# Patient Record
Sex: FEMALE | Race: BLACK OR AFRICAN AMERICAN
[De-identification: names, ages, dates, MRNs, and addresses within clinical notes are randomized per-mention and may not be internally consistent; named-entity substitution may affect disease eponyms.]

---

## 2006-05-05 VITALS — TEMPERATURE: 99.2 F

## 2017-01-06 ENCOUNTER — HOSPITAL ENCOUNTER (EMERGENCY)
Dept: HOSPITAL 19 - COL.ER | Age: 32
Discharge: HOME | End: 2017-01-06
Payer: MEDICARE

## 2017-01-06 VITALS — SYSTOLIC BLOOD PRESSURE: 124 MMHG | HEART RATE: 77 BPM | DIASTOLIC BLOOD PRESSURE: 76 MMHG

## 2017-01-06 VITALS — TEMPERATURE: 97.5 F

## 2017-01-06 VITALS — WEIGHT: 240.52 LBS | BODY MASS INDEX: 33.67 KG/M2 | HEIGHT: 70.98 IN

## 2017-01-06 DIAGNOSIS — R11.2: Primary | ICD-10-CM

## 2017-01-06 LAB
ADJUSTED CALCIUM: 9.3 MG/DL (ref 8.4–10.2)
ALBUMIN SERPL-MCNC: 3.9 GM/DL (ref 3.5–5)
ALP SERPL-CCNC: 62 U/L (ref 50–136)
ALT SERPL-CCNC: 28 U/L (ref 9–52)
ANION GAP SERPL CALC-SCNC: 10 MMOL/L (ref 7–16)
BASOPHILS # BLD: 0 10*3/UL (ref 0–0.2)
BASOPHILS NFR BLD AUTO: 0.3 % (ref 0–2)
BILIRUB SERPL-MCNC: 0.6 MG/DL (ref 0–1)
BUN SERPL-MCNC: 10 MG/DL (ref 7–17)
CALCIUM SERPL-MCNC: 9.2 MG/DL (ref 8.4–10.2)
CHLORIDE SERPL-SCNC: 100 MMOL/L (ref 98–107)
CO2 SERPL-SCNC: 27 MMOL/L (ref 22–30)
CREAT SERPL-SCNC: 0.85 MG/DL (ref 0.52–1.25)
EOSINOPHIL # BLD: 0.6 10*3/UL (ref 0–0.7)
EOSINOPHIL NFR BLD: 9.6 % (ref 0–4)
ERYTHROCYTE [DISTWIDTH] IN BLOOD BY AUTOMATED COUNT: 12.3 % (ref 11.5–14.5)
GLUCOSE SERPL-MCNC: 102 MG/DL (ref 74–106)
GRANULOCYTES # BLD AUTO: 38.2 % (ref 42.2–75.2)
HCT VFR BLD AUTO: 37.8 % (ref 37–47)
HGB BLD-MCNC: 12.7 G/DL (ref 12.5–16)
LIPASE SERPL-CCNC: 22 U/L (ref 23–300)
LYMPHOCYTES # BLD: 2.9 10*3/UL (ref 1.2–3.4)
LYMPHOCYTES NFR BLD: 47.4 % (ref 20–51)
MCH RBC QN AUTO: 29 PG (ref 27–31)
MCHC RBC AUTO-ENTMCNC: 34 G/DL (ref 33–37)
MCV RBC AUTO: 87 FL (ref 80–100)
MONOCYTES # BLD: 0.3 10*3/UL (ref 0.1–0.6)
MONOCYTES NFR BLD AUTO: 4.3 % (ref 1.7–9.3)
NEUTROPHILS # BLD: 2.3 10*3/UL (ref 1.4–6.5)
PLATELET # BLD AUTO: 285 K/MM3 (ref 130–400)
PMV BLD AUTO: 9.8 FL (ref 7.4–10.4)
POTASSIUM SERPL-SCNC: 3.8 MMOL/L (ref 3.4–5)
PROT SERPL-MCNC: 7.5 GM/DL (ref 6.4–8.2)
RBC # BLD AUTO: 4.37 M/MM3 (ref 4.1–5.3)
SODIUM SERPL-SCNC: 137 MMOL/L (ref 137–145)
WBC # BLD AUTO: 6.1 K/MM3 (ref 4.8–10.8)

## 2017-07-31 ENCOUNTER — HOSPITAL ENCOUNTER (EMERGENCY)
Dept: HOSPITAL 19 - COL.ER | Age: 32
Discharge: HOME | End: 2017-07-31
Payer: MEDICARE

## 2017-07-31 VITALS — BODY MASS INDEX: 32.98 KG/M2 | HEIGHT: 70 IN | WEIGHT: 230.38 LBS

## 2017-07-31 VITALS — TEMPERATURE: 97.9 F

## 2017-07-31 VITALS — HEART RATE: 91 BPM | SYSTOLIC BLOOD PRESSURE: 118 MMHG | DIASTOLIC BLOOD PRESSURE: 80 MMHG

## 2017-07-31 DIAGNOSIS — H10.9: Primary | ICD-10-CM

## 2017-07-31 DIAGNOSIS — G89.29: ICD-10-CM

## 2017-07-31 DIAGNOSIS — F17.200: ICD-10-CM

## 2017-07-31 DIAGNOSIS — F43.10: ICD-10-CM

## 2017-07-31 DIAGNOSIS — F41.9: ICD-10-CM

## 2017-07-31 DIAGNOSIS — M54.9: ICD-10-CM

## 2017-07-31 DIAGNOSIS — J45.909: ICD-10-CM

## 2017-07-31 DIAGNOSIS — F31.9: ICD-10-CM

## 2017-08-24 ENCOUNTER — HOSPITAL ENCOUNTER (EMERGENCY)
Dept: HOSPITAL 19 - COL.ER | Age: 32
LOS: 1 days | Discharge: HOME | End: 2017-08-25
Payer: MEDICARE

## 2017-08-24 VITALS — TEMPERATURE: 98.9 F | DIASTOLIC BLOOD PRESSURE: 75 MMHG | SYSTOLIC BLOOD PRESSURE: 124 MMHG

## 2017-08-24 VITALS — WEIGHT: 232.59 LBS | HEIGHT: 69.02 IN | BODY MASS INDEX: 34.45 KG/M2

## 2017-08-24 DIAGNOSIS — F17.210: ICD-10-CM

## 2017-08-24 DIAGNOSIS — H57.13: Primary | ICD-10-CM

## 2017-08-25 VITALS — HEART RATE: 92 BPM

## 2017-09-10 ENCOUNTER — HOSPITAL ENCOUNTER (EMERGENCY)
Dept: HOSPITAL 19 - COL.ER | Age: 32
Discharge: HOME | End: 2017-09-10
Payer: MEDICARE

## 2017-09-10 VITALS — BODY MASS INDEX: 34.12 KG/M2 | HEIGHT: 69.02 IN | WEIGHT: 230.38 LBS

## 2017-09-10 VITALS — TEMPERATURE: 97.1 F

## 2017-09-10 VITALS — SYSTOLIC BLOOD PRESSURE: 145 MMHG | HEART RATE: 66 BPM | DIASTOLIC BLOOD PRESSURE: 80 MMHG

## 2017-09-10 DIAGNOSIS — Z32.02: ICD-10-CM

## 2017-09-10 DIAGNOSIS — F31.9: ICD-10-CM

## 2017-09-10 DIAGNOSIS — F17.210: ICD-10-CM

## 2017-09-10 DIAGNOSIS — R11.2: ICD-10-CM

## 2017-09-10 DIAGNOSIS — R51: Primary | ICD-10-CM

## 2017-09-10 LAB
ADJUSTED CALCIUM: 9.1 MG/DL (ref 8.4–10.2)
ALBUMIN SERPL-MCNC: 3.9 GM/DL (ref 3.5–5)
ALP SERPL-CCNC: 58 U/L (ref 50–136)
ALT SERPL-CCNC: 26 U/L (ref 9–52)
ANION GAP SERPL CALC-SCNC: 7 MMOL/L (ref 7–16)
BASOPHILS # BLD: 0 10*3/UL (ref 0–0.2)
BASOPHILS NFR BLD AUTO: 0.7 % (ref 0–2)
BILIRUB SERPL-MCNC: 0.3 MG/DL (ref 0–1)
BUN SERPL-MCNC: 5 MG/DL (ref 7–17)
CALCIUM SERPL-MCNC: 9 MG/DL (ref 8.4–10.2)
CHLORIDE SERPL-SCNC: 99 MMOL/L (ref 98–107)
CO2 SERPL-SCNC: 29 MMOL/L (ref 22–30)
CREAT SERPL-SCNC: 0.69 MG/DL (ref 0.52–1.25)
EOSINOPHIL # BLD: 0.1 10*3/UL (ref 0–0.7)
EOSINOPHIL NFR BLD: 3.4 % (ref 0–4)
ERYTHROCYTE [DISTWIDTH] IN BLOOD BY AUTOMATED COUNT: 14.3 % (ref 11.5–14.5)
GLUCOSE SERPL-MCNC: 95 MG/DL (ref 74–106)
GRANULOCYTES # BLD AUTO: 55 % (ref 42.2–75.2)
HCT VFR BLD AUTO: 37.1 % (ref 37–47)
HGB BLD-MCNC: 12 G/DL (ref 12.5–16)
LYMPHOCYTES # BLD: 1.4 10*3/UL (ref 1.2–3.4)
LYMPHOCYTES NFR BLD: 35.1 % (ref 20–51)
MCH RBC QN AUTO: 29 PG (ref 27–31)
MCHC RBC AUTO-ENTMCNC: 32 G/DL (ref 33–37)
MCV RBC AUTO: 91 FL (ref 80–100)
MONOCYTES # BLD: 0.2 10*3/UL (ref 0.1–0.6)
MONOCYTES NFR BLD AUTO: 5.6 % (ref 1.7–9.3)
NEUTROPHILS # BLD: 2.3 10*3/UL (ref 1.4–6.5)
PH UR STRIP.AUTO: 7 [PH] (ref 5–8)
PLATELET # BLD AUTO: 280 K/MM3 (ref 130–400)
PMV BLD AUTO: 10.3 FL (ref 7.4–10.4)
POTASSIUM SERPL-SCNC: 4.1 MMOL/L (ref 3.4–5)
PROT SERPL-MCNC: 7.2 GM/DL (ref 6.4–8.2)
RBC # BLD AUTO: 4.1 M/MM3 (ref 4.1–5.3)
RBC # UR: (no result) /HPF
SODIUM SERPL-SCNC: 136 MMOL/L (ref 137–145)
SP GR UR STRIP.AUTO: 1.01 (ref 1–1.03)
SQUAMOUS # URNS: (no result) /HPF
WBC # BLD AUTO: 4.1 K/MM3 (ref 4.8–10.8)
WBC # UR: (no result) /HPF

## 2019-07-31 ENCOUNTER — HOSPITAL ENCOUNTER (EMERGENCY)
Dept: HOSPITAL 19 - COL.ER | Age: 34
Discharge: HOME | End: 2019-07-31
Payer: MEDICARE

## 2019-07-31 VITALS — BODY MASS INDEX: 34.87 KG/M2 | WEIGHT: 235.45 LBS | HEIGHT: 69.02 IN

## 2019-07-31 VITALS — HEART RATE: 93 BPM

## 2019-07-31 VITALS — TEMPERATURE: 97.3 F | SYSTOLIC BLOOD PRESSURE: 137 MMHG | DIASTOLIC BLOOD PRESSURE: 86 MMHG

## 2019-07-31 DIAGNOSIS — J45.909: ICD-10-CM

## 2019-07-31 DIAGNOSIS — F17.210: ICD-10-CM

## 2019-07-31 DIAGNOSIS — F31.9: ICD-10-CM

## 2019-07-31 DIAGNOSIS — F41.9: ICD-10-CM

## 2019-07-31 DIAGNOSIS — G43.909: Primary | ICD-10-CM

## 2019-07-31 DIAGNOSIS — F43.10: ICD-10-CM

## 2020-10-09 ENCOUNTER — HOSPITAL ENCOUNTER (OUTPATIENT)
Dept: HOSPITAL 19 - COL.RAD | Age: 35
End: 2020-10-09
Attending: FAMILY MEDICINE
Payer: MEDICARE

## 2020-10-09 VITALS — DIASTOLIC BLOOD PRESSURE: 65 MMHG | HEART RATE: 107 BPM | SYSTOLIC BLOOD PRESSURE: 104 MMHG

## 2020-10-09 VITALS — WEIGHT: 277.12 LBS | HEIGHT: 69.02 IN | BODY MASS INDEX: 41.04 KG/M2

## 2020-10-09 VITALS — HEART RATE: 90 BPM | DIASTOLIC BLOOD PRESSURE: 41 MMHG | SYSTOLIC BLOOD PRESSURE: 106 MMHG

## 2020-10-09 DIAGNOSIS — M51.36: Primary | ICD-10-CM

## 2020-10-28 ENCOUNTER — HOSPITAL ENCOUNTER (OUTPATIENT)
Dept: HOSPITAL 19 - COL.RAD | Age: 35
End: 2020-10-28
Attending: FAMILY MEDICINE
Payer: MEDICARE

## 2020-10-28 VITALS — HEIGHT: 69.02 IN | WEIGHT: 273.37 LBS | BODY MASS INDEX: 40.49 KG/M2

## 2020-10-28 VITALS — SYSTOLIC BLOOD PRESSURE: 137 MMHG | DIASTOLIC BLOOD PRESSURE: 98 MMHG | HEART RATE: 91 BPM

## 2020-10-28 VITALS — SYSTOLIC BLOOD PRESSURE: 150 MMHG | DIASTOLIC BLOOD PRESSURE: 94 MMHG | HEART RATE: 95 BPM

## 2020-10-28 DIAGNOSIS — M47.816: ICD-10-CM

## 2020-10-28 DIAGNOSIS — M48.061: Primary | ICD-10-CM

## 2020-10-28 DIAGNOSIS — M51.26: ICD-10-CM

## 2021-02-08 ENCOUNTER — HOSPITAL ENCOUNTER (OUTPATIENT)
Dept: HOSPITAL 19 - COL.RAD | Age: 36
End: 2021-02-08
Attending: FAMILY MEDICINE
Payer: MEDICARE

## 2021-02-08 VITALS — HEART RATE: 90 BPM | DIASTOLIC BLOOD PRESSURE: 84 MMHG | SYSTOLIC BLOOD PRESSURE: 146 MMHG

## 2021-02-08 VITALS — HEART RATE: 98 BPM | DIASTOLIC BLOOD PRESSURE: 79 MMHG | SYSTOLIC BLOOD PRESSURE: 131 MMHG

## 2021-02-08 VITALS — WEIGHT: 280.43 LBS | BODY MASS INDEX: 41.53 KG/M2 | HEIGHT: 69.02 IN

## 2021-02-08 DIAGNOSIS — M51.37: Primary | ICD-10-CM

## 2021-08-07 ENCOUNTER — HOSPITAL ENCOUNTER (EMERGENCY)
Dept: HOSPITAL 19 - COL.ER | Age: 36
Discharge: HOME | End: 2021-08-07
Attending: FAMILY MEDICINE
Payer: MEDICARE

## 2021-08-07 VITALS — HEART RATE: 105 BPM | SYSTOLIC BLOOD PRESSURE: 129 MMHG | DIASTOLIC BLOOD PRESSURE: 96 MMHG

## 2021-08-07 VITALS — BODY MASS INDEX: 35.62 KG/M2 | WEIGHT: 240.52 LBS | HEIGHT: 69.02 IN

## 2021-08-07 VITALS — TEMPERATURE: 98 F
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
## 2021-08-07 DIAGNOSIS — M51.36: Primary | ICD-10-CM

## 2021-08-07 DIAGNOSIS — F43.10: ICD-10-CM

## 2021-08-07 DIAGNOSIS — Z79.899: ICD-10-CM

## 2021-08-07 DIAGNOSIS — F31.9: ICD-10-CM

## 2021-08-07 DIAGNOSIS — F41.9: ICD-10-CM

## 2021-08-07 NOTE — NUR
SW called to ER by nurse stating that patient requested to speak to someone
about a concern that she had in regards to staff. [facility]CO met with patient and
daughter Sia present during conversation. Patient stated that she felt as
though there were no individuals present as far as nurses and doctors that
would listen to what she had to say in regards to her care. SW listened to
patient and provided feedback in regards to her concern. Patient appreciated a
listening ear and wanted to ensure that all staff in the hospital should have
proper bedside manner when communicating with patients about their care.
Patient also stated that she had not eaten or slept in the past few days due
to an accident that she had been in, and worrying about other family members.
SW provided information to patient in regards to the importance of eating and
sleeping well to be able to properly function to complete tasks effectively.
Patient agreed to information and stated that she would do better moving
forward and appreciated the listening ear. Nurse came in soon after to provide
patient with discharge instructions. Nothing further.

## 2022-02-21 ENCOUNTER — HOSPITAL ENCOUNTER (EMERGENCY)
Dept: HOSPITAL 19 - COL.ER | Age: 37
LOS: 1 days | Discharge: HOME | End: 2022-02-22
Attending: FAMILY MEDICINE
Payer: MEDICARE

## 2022-02-21 VITALS — TEMPERATURE: 98.5 F

## 2022-02-21 VITALS — BODY MASS INDEX: 41.88 KG/M2 | WEIGHT: 260.59 LBS | HEIGHT: 65.98 IN

## 2022-02-21 DIAGNOSIS — M54.9: ICD-10-CM

## 2022-02-21 DIAGNOSIS — Z79.891: ICD-10-CM

## 2022-02-21 DIAGNOSIS — G89.29: ICD-10-CM

## 2022-02-21 DIAGNOSIS — R41.82: Primary | ICD-10-CM

## 2022-02-21 LAB
ALBUMIN SERPL-MCNC: 3.8 GM/DL (ref 3.5–5)
ALP SERPL-CCNC: 95 U/L (ref 40–150)
ALT SERPL-CCNC: 52 U/L (ref 0–55)
ANION GAP SERPL CALC-SCNC: 10 MMOL/L (ref 7–16)
APAP SERPL-MCNC: < 1 UG/ML (ref 10–30)
AST SERPL-CCNC: 34 U/L (ref 5–34)
BASOPHILS # BLD: 0 K/MM3 (ref 0–0.2)
BASOPHILS NFR BLD AUTO: 0.5 % (ref 0–2)
BILIRUB SERPL-MCNC: 0.3 MG/DL (ref 0.2–1.2)
BUN SERPL-MCNC: 11 MG/DL (ref 7–19)
CALCIUM SERPL-MCNC: 8.7 MG/DL (ref 8.4–10.2)
CHLORIDE SERPL-SCNC: 102 MMOL/L (ref 98–107)
CO2 SERPL-SCNC: 24 MMOL/L (ref 22–29)
CREAT SERPL-SCNC: 1.02 MG/DL (ref 0.57–1.11)
DIGOXIN SERPL-MCNC: 19.9 NG/ML (ref 5.18–26.53)
DRUGS UR SCN NOM: NEGATIVE NG/ML
EOSINOPHIL # BLD: 0.2 K/MM3 (ref 0–0.7)
EOSINOPHIL NFR BLD: 1.9 % (ref 0–4)
ERYTHROCYTE [DISTWIDTH] IN BLOOD BY AUTOMATED COUNT: 14 % (ref 11.5–14.5)
ETHANOL SPEC-SCNC: < 10 MG/DL (ref 0–10)
GLUCOSE SERPL-MCNC: 102 MG/DL (ref 70–99)
GRANULOCYTES # BLD AUTO: 45.2 % (ref 42.2–75.2)
HCG SERPL QL: NEGATIVE
HCT VFR BLD AUTO: 36.3 % (ref 37–47)
HGB BLD-MCNC: 12.2 G/DL (ref 12.5–16)
LYMPHOCYTES # BLD: 3.7 K/MM3 (ref 1.2–3.4)
LYMPHOCYTES NFR BLD: 44.1 % (ref 20–51)
MCH RBC QN AUTO: 29 PG (ref 27–31)
MCHC RBC AUTO-ENTMCNC: 34 G/DL (ref 33–37)
MCV RBC AUTO: 86 FL (ref 80–100)
MONOCYTES # BLD: 0.7 K/MM3 (ref 0.1–0.6)
MONOCYTES NFR BLD AUTO: 8.1 % (ref 1.7–9.3)
NEUTROPHILS # BLD: 3.8 K/MM3 (ref 1.4–6.5)
PH UR STRIP.AUTO: 6 [PH] (ref 5–8)
PLATELET # BLD AUTO: 354 K/MM3 (ref 130–400)
PMV BLD AUTO: 10 FL (ref 7.4–10.4)
POTASSIUM SERPL-SCNC: 3.4 MMOL/L (ref 3.5–4.5)
PROT SERPL-MCNC: 7.4 GM/DL (ref 6.2–8.1)
RBC # BLD AUTO: 4.2 M/MM3 (ref 4.1–5.3)
RBC # UR STRIP.AUTO: (no result) /UL
RBC # UR: (no result) /HPF (ref 0–2)
SALICYLATES SERPL-MCNC: < 5 MG/DL (ref 15–30)
SODIUM SERPL-SCNC: 136 MMOL/L (ref 136–145)
SP GR UR STRIP.AUTO: 1.02 (ref 1–1.03)
SQUAMOUS # URNS: (no result) /HPF (ref 0–10)
URN COLLECT METHOD CLASS: (no result)

## 2022-02-22 VITALS — DIASTOLIC BLOOD PRESSURE: 67 MMHG | SYSTOLIC BLOOD PRESSURE: 144 MMHG | HEART RATE: 82 BPM

## 2022-02-22 NOTE — NUR
received consult in the ED for patient who was brought in after
being found unresponsive at a local phone store. Patient is taking medications
that are not safe to drive while taking and patient drove her children to the
store after reporting taking these medications. SW made report to [facility]
(intake#[id number]).

## 2022-03-22 ENCOUNTER — HOSPITAL ENCOUNTER (EMERGENCY)
Dept: HOSPITAL 19 - COL.ER | Age: 37
LOS: 1 days | Discharge: HOME | End: 2022-03-23
Attending: STUDENT IN AN ORGANIZED HEALTH CARE EDUCATION/TRAINING PROGRAM
Payer: MEDICARE

## 2022-03-22 VITALS — BODY MASS INDEX: 37.55 KG/M2 | WEIGHT: 253.53 LBS | HEIGHT: 69.02 IN

## 2022-03-22 VITALS — TEMPERATURE: 98.2 F

## 2022-03-22 DIAGNOSIS — E87.1: ICD-10-CM

## 2022-03-22 DIAGNOSIS — E86.0: Primary | ICD-10-CM

## 2022-03-22 DIAGNOSIS — R94.4: ICD-10-CM

## 2022-03-22 DIAGNOSIS — F41.9: ICD-10-CM

## 2022-03-22 LAB
ALBUMIN SERPL-MCNC: 3.9 GM/DL (ref 3.5–5)
ALP SERPL-CCNC: 75 U/L (ref 40–150)
ALT SERPL-CCNC: 15 U/L (ref 0–55)
ANION GAP SERPL CALC-SCNC: 10 MMOL/L (ref 7–16)
AST SERPL-CCNC: 16 U/L (ref 5–34)
BASOPHILS # BLD: 0 K/MM3 (ref 0–0.2)
BASOPHILS NFR BLD AUTO: 0.5 % (ref 0–2)
BILIRUB SERPL-MCNC: 0.3 MG/DL (ref 0.2–1.2)
BUN SERPL-MCNC: 7 MG/DL (ref 7–19)
CALCIUM SERPL-MCNC: 8.9 MG/DL (ref 8.4–10.2)
CHLORIDE SERPL-SCNC: 100 MMOL/L (ref 98–107)
CO2 SERPL-SCNC: 25 MMOL/L (ref 22–29)
CREAT SERPL-SCNC: 1.23 MG/DL (ref 0.57–1.11)
EOSINOPHIL # BLD: 0.2 K/MM3 (ref 0–0.7)
EOSINOPHIL NFR BLD: 2.5 % (ref 0–4)
ERYTHROCYTE [DISTWIDTH] IN BLOOD BY AUTOMATED COUNT: 13.3 % (ref 11.5–14.5)
GLUCOSE SERPL-MCNC: 79 MG/DL (ref 70–99)
GRANULOCYTES # BLD AUTO: 42.4 % (ref 42.2–75.2)
HCT VFR BLD AUTO: 37.9 % (ref 37–47)
HGB BLD-MCNC: 12.2 G/DL (ref 12.5–16)
LYMPHOCYTES # BLD: 3 K/MM3 (ref 1.2–3.4)
LYMPHOCYTES NFR BLD: 49 % (ref 20–51)
MCH RBC QN AUTO: 28 PG (ref 27–31)
MCHC RBC AUTO-ENTMCNC: 32 G/DL (ref 33–37)
MCV RBC AUTO: 87 FL (ref 80–100)
MONOCYTES # BLD: 0.3 K/MM3 (ref 0.1–0.6)
MONOCYTES NFR BLD AUTO: 5.4 % (ref 1.7–9.3)
NEUTROPHILS # BLD: 2.6 K/MM3 (ref 1.4–6.5)
PLATELET # BLD AUTO: 283 K/MM3 (ref 130–400)
PMV BLD AUTO: 10.6 FL (ref 7.4–10.4)
POTASSIUM SERPL-SCNC: 4.2 MMOL/L (ref 3.5–4.5)
PROT SERPL-MCNC: 7.9 GM/DL (ref 6.2–8.1)
RBC # BLD AUTO: 4.34 M/MM3 (ref 4.1–5.3)
SODIUM SERPL-SCNC: 135 MMOL/L (ref 136–145)
TROPONIN I SERPL-MCNC: < 0.01 NG/ML (ref 0–0.03)

## 2022-03-23 VITALS — DIASTOLIC BLOOD PRESSURE: 79 MMHG | HEART RATE: 90 BPM | SYSTOLIC BLOOD PRESSURE: 104 MMHG

## 2022-03-23 LAB
PH UR STRIP.AUTO: 5 [PH] (ref 5–8)
RBC # UR STRIP.AUTO: (no result) /UL
RBC # UR: >50 /HPF (ref 0–2)
SP GR UR STRIP.AUTO: 1.01 (ref 1–1.03)
SQUAMOUS # URNS: (no result) /HPF (ref 0–10)
URN COLLECT METHOD CLASS: (no result)

## 2022-05-02 ENCOUNTER — HOSPITAL ENCOUNTER (EMERGENCY)
Dept: HOSPITAL 19 - COL.ER | Age: 37
Discharge: HOME | End: 2022-05-02
Attending: FAMILY MEDICINE
Payer: MEDICARE

## 2022-05-02 VITALS — HEART RATE: 62 BPM | SYSTOLIC BLOOD PRESSURE: 120 MMHG | DIASTOLIC BLOOD PRESSURE: 61 MMHG

## 2022-05-02 VITALS — WEIGHT: 243.61 LBS | HEIGHT: 69.02 IN | BODY MASS INDEX: 36.08 KG/M2

## 2022-05-02 VITALS — TEMPERATURE: 96.9 F

## 2022-05-02 DIAGNOSIS — Z20.822: ICD-10-CM

## 2022-05-02 DIAGNOSIS — A08.4: Primary | ICD-10-CM

## 2022-05-02 LAB
ALBUMIN SERPL-MCNC: 3.7 GM/DL (ref 3.5–5)
ALP SERPL-CCNC: 63 U/L (ref 40–150)
ALT SERPL-CCNC: 13 U/L (ref 0–55)
ANION GAP SERPL CALC-SCNC: 10 MMOL/L (ref 7–16)
AST SERPL-CCNC: 18 U/L (ref 5–34)
BASOPHILS # BLD: 0 K/MM3 (ref 0–0.2)
BASOPHILS NFR BLD AUTO: 0.1 % (ref 0–2)
BILIRUB SERPL-MCNC: 0.2 MG/DL (ref 0.2–1.2)
BUN SERPL-MCNC: 12 MG/DL (ref 7–19)
CALCIUM SERPL-MCNC: 8.6 MG/DL (ref 8.4–10.2)
CHLORIDE SERPL-SCNC: 108 MMOL/L (ref 98–107)
CO2 SERPL-SCNC: 20 MMOL/L (ref 22–29)
CREAT SERPL-SCNC: 1.1 MG/DL (ref 0.57–1.11)
CRP SERPL-MCNC: 0.41 MG/DL (ref 0–0.5)
EOSINOPHIL # BLD: 0.1 K/MM3 (ref 0–0.7)
EOSINOPHIL NFR BLD: 1.3 % (ref 0–4)
ERYTHROCYTE [DISTWIDTH] IN BLOOD BY AUTOMATED COUNT: 14.2 % (ref 11.5–14.5)
GLUCOSE SERPL-MCNC: 109 MG/DL (ref 70–99)
GRANULOCYTES # BLD AUTO: 66.8 % (ref 42.2–75.2)
HCT VFR BLD AUTO: 39.1 % (ref 37–47)
HGB BLD-MCNC: 12.7 G/DL (ref 12.5–16)
LIPASE SERPL-CCNC: 15 U/L (ref 8–78)
LYMPHOCYTES # BLD: 1.8 K/MM3 (ref 1.2–3.4)
LYMPHOCYTES NFR BLD: 26.2 % (ref 20–51)
MCH RBC QN AUTO: 28 PG (ref 27–31)
MCHC RBC AUTO-ENTMCNC: 33 G/DL (ref 33–37)
MCV RBC AUTO: 87 FL (ref 80–100)
MONOCYTES # BLD: 0.4 K/MM3 (ref 0.1–0.6)
MONOCYTES NFR BLD AUTO: 5.2 % (ref 1.7–9.3)
NEUTROPHILS # BLD: 4.5 K/MM3 (ref 1.4–6.5)
PH UR STRIP.AUTO: 6 [PH] (ref 5–8)
PLATELET # BLD AUTO: 285 K/MM3 (ref 130–400)
PMV BLD AUTO: 11.1 FL (ref 7.4–10.4)
POTASSIUM SERPL-SCNC: 4.5 MMOL/L (ref 3.5–4.5)
PROT SERPL-MCNC: 7.1 GM/DL (ref 6.2–8.1)
RBC # BLD AUTO: 4.5 M/MM3 (ref 4.1–5.3)
RBC # UR: (no result) /HPF (ref 0–2)
SODIUM SERPL-SCNC: 138 MMOL/L (ref 136–145)
SP GR UR STRIP.AUTO: 1 (ref 1–1.03)
SQUAMOUS # URNS: (no result) /HPF (ref 0–10)
URN COLLECT METHOD CLASS: (no result)

## 2022-06-21 ENCOUNTER — HOSPITAL ENCOUNTER (OUTPATIENT)
Dept: HOSPITAL 19 - MHCPAIN | Age: 37
End: 2022-06-21
Attending: ANESTHESIOLOGY
Payer: MEDICARE

## 2022-06-21 DIAGNOSIS — M54.50: ICD-10-CM

## 2022-06-21 DIAGNOSIS — M47.817: ICD-10-CM

## 2022-06-21 DIAGNOSIS — M53.3: Primary | ICD-10-CM

## 2022-06-21 PROCEDURE — G0463 HOSPITAL OUTPT CLINIC VISIT: HCPCS

## 2022-06-30 ENCOUNTER — HOSPITAL ENCOUNTER (OUTPATIENT)
Dept: HOSPITAL 19 - MHCPAIN | Age: 37
End: 2022-06-30
Attending: ANESTHESIOLOGY
Payer: MEDICARE

## 2022-06-30 DIAGNOSIS — M53.3: ICD-10-CM

## 2022-06-30 DIAGNOSIS — M47.817: Primary | ICD-10-CM

## 2022-06-30 DIAGNOSIS — M54.50: ICD-10-CM

## 2024-10-02 ENCOUNTER — HOSPITAL ENCOUNTER (OUTPATIENT)
Dept: HOSPITAL 19 - MHCPAIN | Age: 39
End: 2024-10-02
Attending: ANESTHESIOLOGY
Payer: MEDICARE

## 2024-10-02 DIAGNOSIS — M51.26: Primary | ICD-10-CM

## 2024-10-02 DIAGNOSIS — M54.16: ICD-10-CM

## 2024-10-02 DIAGNOSIS — M47.816: ICD-10-CM

## 2024-10-02 DIAGNOSIS — M79.7: ICD-10-CM

## 2024-10-02 PROCEDURE — G0463 HOSPITAL OUTPT CLINIC VISIT: HCPCS
